# Patient Record
Sex: MALE | Race: WHITE | NOT HISPANIC OR LATINO | URBAN - METROPOLITAN AREA
[De-identification: names, ages, dates, MRNs, and addresses within clinical notes are randomized per-mention and may not be internally consistent; named-entity substitution may affect disease eponyms.]

---

## 2020-01-28 ENCOUNTER — APPOINTMENT (RX ONLY)
Dept: URBAN - METROPOLITAN AREA CLINIC 16 | Facility: CLINIC | Age: 20
Setting detail: DERMATOLOGY
End: 2020-01-28

## 2020-01-28 DIAGNOSIS — D22 MELANOCYTIC NEVI: ICD-10-CM

## 2020-01-28 DIAGNOSIS — L30.4 ERYTHEMA INTERTRIGO: ICD-10-CM

## 2020-01-28 PROBLEM — D22.61 MELANOCYTIC NEVI OF RIGHT UPPER LIMB, INCLUDING SHOULDER: Status: ACTIVE | Noted: 2020-01-28

## 2020-01-28 PROCEDURE — ? SUNSCREEN RECOMMENDATIONS

## 2020-01-28 PROCEDURE — ? COUNSELING

## 2020-01-28 PROCEDURE — 99202 OFFICE O/P NEW SF 15 MIN: CPT

## 2020-01-28 PROCEDURE — ? TREATMENT REGIMEN

## 2020-01-28 PROCEDURE — ? DIAGNOSIS COMMENT

## 2020-01-28 PROCEDURE — ? PRESCRIPTION

## 2020-01-28 RX ORDER — ZINC OXIDE 40 G/100G
OINTMENT TOPICAL
Qty: 1 | Refills: 3 | Status: ERX | COMMUNITY
Start: 2020-01-28

## 2020-01-28 RX ORDER — TRIAMCINOLONE ACETONIDE 1 MG/G
CREAM TOPICAL
Qty: 1 | Refills: 1 | Status: ERX | COMMUNITY
Start: 2020-01-28

## 2020-01-28 RX ORDER — KETOCONAZOLE 20 MG/G
CREAM TOPICAL
Qty: 1 | Refills: 3 | Status: ERX | COMMUNITY
Start: 2020-01-28

## 2020-01-28 RX ADMIN — TRIAMCINOLONE ACETONIDE: 1 CREAM TOPICAL at 00:00

## 2020-01-28 RX ADMIN — KETOCONAZOLE: 20 CREAM TOPICAL at 00:00

## 2020-01-28 RX ADMIN — ZINC OXIDE: 40 OINTMENT TOPICAL at 00:00

## 2020-01-28 ASSESSMENT — LOCATION DETAILED DESCRIPTION DERM
LOCATION DETAILED: RIGHT ANTERIOR SHOULDER
LOCATION DETAILED: GLUTEAL CLEFT
LOCATION DETAILED: RIGHT ANTERIOR PROXIMAL THIGH
LOCATION DETAILED: LEFT INGUINAL CREASE

## 2020-01-28 ASSESSMENT — LOCATION SIMPLE DESCRIPTION DERM
LOCATION SIMPLE: RIGHT SHOULDER
LOCATION SIMPLE: GROIN
LOCATION SIMPLE: GLUTEAL CLEFT
LOCATION SIMPLE: RIGHT THIGH

## 2020-01-28 ASSESSMENT — LOCATION ZONE DERM
LOCATION ZONE: ARM
LOCATION ZONE: TRUNK
LOCATION ZONE: LEG

## 2020-01-28 NOTE — HPI: RASH
How Severe Is Your Rash?: moderate
Is This A New Presentation, Or A Follow-Up?: Rash
Additional History: perineum area is the biggest concern

## 2020-01-28 NOTE — PROCEDURE: TREATMENT REGIMEN
Detail Level: Simple
Initiate Treatment: For flare of rash: \\nStart ketoconazole 2% cream twice daily \\nThen apply triamcinolone 0.1% cream twice daily as needed. If need to use beyond 2 consecutive weeks, then decrease frequency of use to Monday-Friday. \\nAfter applying both of these, apply zinc oxide 40% paste - if not covered by insurance, then recommend purchasing over-the-counter Triple Paste. Use a very thick layer of this. \\n\\nWhen flare resolves: \\nFor prevention, use either Zeasorb AF (antifungal) powder and/or prescription-strength deodorant such as Dove.\\n\\nRecommend avoidance of scratching

## 2020-01-28 NOTE — PROCEDURE: MIPS QUALITY
Detail Level: Detailed
Quality 47: Advance Care Plan: Advance Care Planning discussed and documented; advance care plan or surrogate decision maker documented in the medical record.
Quality 226: Preventive Care And Screening: Tobacco Use: Screening And Cessation Intervention: Patient screened for tobacco use, is a smoker AND received Cessation Counseling
Quality 110: Preventive Care And Screening: Influenza Immunization: Influenza Immunization Administered during Influenza season

## 2020-01-28 NOTE — PROCEDURE: DIAGNOSIS COMMENT
Detail Level: Simple
Comment: Moderate to severe intertrigo with erosions from scratching involving groin and perineum. Start regimen as below, f/u 4 weeks

## 2020-02-25 ENCOUNTER — APPOINTMENT (RX ONLY)
Dept: URBAN - METROPOLITAN AREA CLINIC 16 | Facility: CLINIC | Age: 20
Setting detail: DERMATOLOGY
End: 2020-02-25

## 2020-02-25 DIAGNOSIS — L30.4 ERYTHEMA INTERTRIGO: ICD-10-CM

## 2020-02-25 PROCEDURE — ? PRESCRIPTION

## 2020-02-25 PROCEDURE — ? TREATMENT REGIMEN

## 2020-02-25 PROCEDURE — ? COUNSELING

## 2020-02-25 PROCEDURE — ? DIAGNOSIS COMMENT

## 2020-02-25 PROCEDURE — 99213 OFFICE O/P EST LOW 20 MIN: CPT

## 2020-02-25 PROCEDURE — ? ORDER TESTS

## 2020-02-25 RX ORDER — TACROLIMUS 1 MG/G
OINTMENT TOPICAL
Qty: 1 | Refills: 1 | Status: ERX | COMMUNITY
Start: 2020-02-25

## 2020-02-25 RX ORDER — BETAMETHASONE DIPROPIONATE 0.5 MG/G
OINTMENT, AUGMENTED TOPICAL
Qty: 1 | Refills: 0 | Status: ERX | COMMUNITY
Start: 2020-02-25

## 2020-02-25 RX ADMIN — BETAMETHASONE DIPROPIONATE: 0.5 OINTMENT, AUGMENTED TOPICAL at 00:00

## 2020-02-25 RX ADMIN — TACROLIMUS: 1 OINTMENT TOPICAL at 00:00

## 2020-02-25 ASSESSMENT — LOCATION SIMPLE DESCRIPTION DERM
LOCATION SIMPLE: GLUTEAL CLEFT
LOCATION SIMPLE: GROIN
LOCATION SIMPLE: RIGHT THIGH

## 2020-02-25 ASSESSMENT — LOCATION ZONE DERM
LOCATION ZONE: TRUNK
LOCATION ZONE: LEG

## 2020-02-25 ASSESSMENT — LOCATION DETAILED DESCRIPTION DERM
LOCATION DETAILED: GLUTEAL CLEFT
LOCATION DETAILED: LEFT INGUINAL CREASE
LOCATION DETAILED: RIGHT ANTERIOR PROXIMAL THIGH

## 2020-02-25 NOTE — PROCEDURE: DIAGNOSIS COMMENT
Comment: Moderate to severe intertrigo resolved from groin but persistent on scrotum. Patient does endorse washing aggressively in the shower, favor component of irritant contact dermatitis. Given persistence on perineum with geometric-shaped erosions, swab sent for HSV culture today. While awaiting results, plan to increase strength of topical steroid to betamethasone aug ointment prior to transitioning to tacrolimus ointment; extensively reviewed the importance of avoiding scratching the area and to maintain a thick layer of zinc oxide paste over the area. If not resolved at follow-up, consider biopsy to rule out other process (eg Ambar-Ambar disease) as well as fungal and bacterial cultures. F/u 4 weeks
Detail Level: Simple

## 2020-02-25 NOTE — PROCEDURE: TREATMENT REGIMEN
Initiate Treatment: To more severe area of rash on perineum: \\nStart betamethasone diproprionate augmented 0.05% ointment twice daily x 2 weeks and then transition to tacrolimus 0.1% ointment twice daily until follow-up appointment. Apply a thick layer of zinc oxide paste over top. Avoid scrubbing this area.
Continue Regimen: For flare of rash: \\nContinue ketoconazole 2% cream twice daily \\nThen apply triamcinolone 0.1% cream twice daily as needed. If need to use beyond 2 consecutive weeks, then decrease frequency of use to Monday-Friday. \\nAfter applying both of these, apply zinc oxide 40% paste - if not covered by insurance, then recommend purchasing over-the-counter Triple Paste. Use a very thick layer of this. \\n\\nWhen flare resolves: \\nFor prevention, use either Zeasorb AF (antifungal) powder and/or prescription-strength deodorant such as Dove.\\n\\nRecommend avoidance of scratching or scrubbing
Detail Level: Simple

## 2020-05-06 RX ORDER — TRIAMCINOLONE ACETONIDE 1 MG/G
CREAM TOPICAL
Qty: 1 | Refills: 0 | Status: ERX

## 2020-05-06 RX ORDER — KETOCONAZOLE 20 MG/G
CREAM TOPICAL
Qty: 1 | Refills: 1 | Status: ERX

## 2020-06-11 ENCOUNTER — APPOINTMENT (RX ONLY)
Dept: URBAN - METROPOLITAN AREA CLINIC 16 | Facility: CLINIC | Age: 20
Setting detail: DERMATOLOGY
End: 2020-06-11

## 2020-06-11 DIAGNOSIS — L30.4 ERYTHEMA INTERTRIGO: ICD-10-CM

## 2020-06-11 PROCEDURE — 99213 OFFICE O/P EST LOW 20 MIN: CPT

## 2020-06-11 PROCEDURE — ? COUNSELING

## 2020-06-11 PROCEDURE — ? DIAGNOSIS COMMENT

## 2020-06-11 PROCEDURE — ? PRESCRIPTION

## 2020-06-11 PROCEDURE — ? TREATMENT REGIMEN

## 2020-06-11 RX ORDER — BETAMETHASONE DIPROPIONATE 0.5 MG/G
OINTMENT, AUGMENTED TOPICAL
Qty: 1 | Refills: 1 | Status: ERX

## 2020-06-11 RX ORDER — TACROLIMUS 1 MG/G
OINTMENT TOPICAL
Qty: 1 | Refills: 1 | Status: ERX

## 2020-06-11 ASSESSMENT — LOCATION SIMPLE DESCRIPTION DERM
LOCATION SIMPLE: GROIN
LOCATION SIMPLE: RIGHT THIGH
LOCATION SIMPLE: GLUTEAL CLEFT

## 2020-06-11 ASSESSMENT — LOCATION DETAILED DESCRIPTION DERM
LOCATION DETAILED: LEFT INGUINAL CREASE
LOCATION DETAILED: RIGHT ANTERIOR PROXIMAL THIGH
LOCATION DETAILED: GLUTEAL CLEFT

## 2020-06-11 ASSESSMENT — LOCATION ZONE DERM
LOCATION ZONE: TRUNK
LOCATION ZONE: LEG

## 2020-06-11 NOTE — PROCEDURE: DIAGNOSIS COMMENT
Detail Level: Simple
Comment: Moderate to severe intertrigo resolved from groin and scrotum but flaring on perineum. Patient reports that he was not compliant with regimen after last visit due to pandemic. Swab for HSV negative at last visit. Reviewed regimen in detail today and handout provided. Given improvement, hold off on biopsy to rule out other process (eg Ambar-Ambar disease) for now, no need for fungal and bacterial cultures now - but reconsider if not responding to treatment as expected at f/u appointment.

## 2020-06-11 NOTE — PROCEDURE: TREATMENT REGIMEN
Detail Level: Simple
Continue Regimen: For flare of rash: \\nContinue ketoconazole 2% cream twice daily \\nThen apply triamcinolone 0.1% cream twice daily as needed. If need to use beyond 2 consecutive weeks, then decrease frequency of use to Monday-Friday. \\nAfter applying both of these, apply zinc oxide 40% paste - if not covered by insurance, then recommend purchasing over-the-counter Triple Paste. Use a very thick layer of this. \\n\\nTo more severe area of rash on buttocks: \\nUse betamethasone diproprionate augmented 0.05% ointment twice daily x 2 weeks and then transition to tacrolimus 0.1% ointment twice daily until follow-up appointment. Apply a thick layer of zinc oxide paste over top. Avoid scrubbing this area.\\n\\nWhen flare resolves: \\nFor prevention, use either Zeasorb AF (antifungal) powder and/or prescription-strength deodorant such as Dove.\\n\\nRecommend avoidance of scratching or scrubbing

## 2020-07-06 ENCOUNTER — APPOINTMENT (RX ONLY)
Dept: URBAN - METROPOLITAN AREA CLINIC 16 | Facility: CLINIC | Age: 20
Setting detail: DERMATOLOGY
End: 2020-07-06

## 2020-07-06 DIAGNOSIS — L30.4 ERYTHEMA INTERTRIGO: ICD-10-CM

## 2020-07-06 PROBLEM — L30.9 DERMATITIS, UNSPECIFIED: Status: ACTIVE | Noted: 2020-07-06

## 2020-07-06 PROCEDURE — ? COUNSELING

## 2020-07-06 PROCEDURE — ? PRESCRIPTION

## 2020-07-06 PROCEDURE — 11104 PUNCH BX SKIN SINGLE LESION: CPT

## 2020-07-06 PROCEDURE — ? DIAGNOSIS COMMENT

## 2020-07-06 PROCEDURE — ? BIOPSY BY PUNCH METHOD

## 2020-07-06 PROCEDURE — ? TREATMENT REGIMEN

## 2020-07-06 RX ORDER — TACROLIMUS 1 MG/G
OINTMENT TOPICAL
Qty: 1 | Refills: 1 | Status: ERX

## 2020-07-06 ASSESSMENT — LOCATION ZONE DERM
LOCATION ZONE: LEG
LOCATION ZONE: TRUNK
LOCATION ZONE: GENITALIA

## 2020-07-06 ASSESSMENT — LOCATION DETAILED DESCRIPTION DERM
LOCATION DETAILED: RIGHT ANTERIOR PROXIMAL THIGH
LOCATION DETAILED: PERINEUM
LOCATION DETAILED: GLUTEAL CLEFT
LOCATION DETAILED: LEFT INGUINAL CREASE

## 2020-07-06 ASSESSMENT — LOCATION SIMPLE DESCRIPTION DERM
LOCATION SIMPLE: GLUTEAL CLEFT
LOCATION SIMPLE: RIGHT THIGH
LOCATION SIMPLE: GROIN
LOCATION SIMPLE: PERINEUM

## 2020-07-06 NOTE — PROCEDURE: DIAGNOSIS COMMENT
Detail Level: Simple
Comment: Moderate to severe intertrigo resolved from groin and scrotum but still flaring on left side of perineum. Patient reports that he has been using tacrolimus ointment consistently since last visit with about 50% improvement. He was not able to tolerate the betamethasone augmented ointment - felt that it caused more redness and irritation so advised that he avoid further use. Swab for HSV negative at prior visit. \\n\\nGiven persistence of involvement of left side of perineum despite compliance with regimen, recommend biopsy to rule out Ambar-Ambar disease vs Paget’s disease vs Bowen’s disease. If biopsy is still consistent with intertrigo, plan to switch class I steroid to clobetasol ointment. Reviewed regimen in detail today and handout provided.

## 2020-07-06 NOTE — PROCEDURE: TREATMENT REGIMEN
Detail Level: Simple
Continue Regimen: For flare of rash: \\nContinue ketoconazole 2% cream twice daily \\nThen apply triamcinolone 0.1% cream twice daily as needed For up to 2 consecutive weeks and then switch to tacrolimus ointment twice daily \\nAfter applying both of these, apply zinc oxide 40% paste - if not covered by insurance, then recommend purchasing over-the-counter Triple Paste. Use a very thick layer of this. \\n\\nWhen flare resolves: \\nFor prevention, use either Zeasorb AF (antifungal) powder and/or prescription-strength deodorant such as Dove.\\n\\nRecommend avoidance of scratching or scrubbing
Discontinue Regimen: Stop betamethasone diproprionate aug ointment given irritation

## 2020-07-06 NOTE — PROCEDURE: BIOPSY BY PUNCH METHOD
Detail Level: Simple
Was A Bandage Applied: Yes
Punch Size In Mm: 4
Biopsy Type: H and E
Anesthesia Type: 1% lidocaine with 1:100,000 epinephrine
Anesthesia Volume In Cc (Will Not Render If 0): 1
Additional Anesthesia Volume In Cc (Will Not Render If 0): 0
Hemostasis: None
Epidermal Sutures: 5-0 Fast Absorbing Gut
Wound Care: Petrolatum
Dressing: telfa dressing
Suture Removal: 14 days
Patient Will Remove Sutures At Home?: No
Lab: 11
Path Notes Override (Will Replace All Of The Above Text): History of erosive intertrigo - with erosive patches of groin, scrotum, and perineum - all sites have responded to topical therapy as expected excepted for persistent eroded patch of left perineum.
Consent: Written consent was obtained and risks were reviewed including but not limited to scarring, infection, bleeding, scabbing, incomplete removal, nerve damage and allergy to anesthesia.
Post-Care Instructions: I reviewed with the patient in detail post-care instructions. Patient is to keep the biopsy site dry overnight, and then apply bacitracin twice daily until healed. Patient may apply hydrogen peroxide soaks to remove any crusting.
Home Suture Removal Text: Patient was provided a home suture removal kit and will remove their sutures at home.  If they have any questions or difficulties they will call the office.
Notification Instructions: Patient will be notified of biopsy results. However, patient instructed to call the office if not contacted within 2 weeks.
Billing Type: Third-Party Bill
Information: Selecting Yes will display possible errors in your note based on the variables you have selected. This validation is only offered as a suggestion for you. PLEASE NOTE THAT THE VALIDATION TEXT WILL BE REMOVED WHEN YOU FINALIZE YOUR NOTE. IF YOU WANT TO FAX A PRELIMINARY NOTE YOU WILL NEED TO TOGGLE THIS TO 'NO' IF YOU DO NOT WANT IT IN YOUR FAXED NOTE.

## 2020-07-20 ENCOUNTER — APPOINTMENT (RX ONLY)
Dept: URBAN - METROPOLITAN AREA CLINIC 16 | Facility: CLINIC | Age: 20
Setting detail: DERMATOLOGY
End: 2020-07-20

## 2020-07-20 DIAGNOSIS — L30.9 DERMATITIS, UNSPECIFIED: ICD-10-CM

## 2020-07-20 PROCEDURE — ? DIAGNOSIS COMMENT

## 2020-07-20 PROCEDURE — ? PRESCRIPTION

## 2020-07-20 PROCEDURE — ? MEDICATION COUNSELING

## 2020-07-20 RX ORDER — DOXYCYCLINE HYCLATE 100 MG/1
CAPSULE, GELATIN COATED ORAL
Qty: 30 | Refills: 0 | Status: ERX | COMMUNITY
Start: 2020-07-20

## 2020-07-20 RX ORDER — CLINDAMYCIN PHOSPHATE 10 MG/ML
LOTION TOPICAL
Qty: 1 | Refills: 3 | Status: ERX | COMMUNITY
Start: 2020-07-20

## 2020-07-20 RX ORDER — DESOXIMETASONE 2.5 MG/G
OINTMENT TOPICAL
Qty: 1 | Refills: 0 | Status: ERX | COMMUNITY
Start: 2020-07-20

## 2020-07-20 RX ADMIN — CLINDAMYCIN PHOSPHATE: 10 LOTION TOPICAL at 00:00

## 2020-07-20 RX ADMIN — DESOXIMETASONE: 2.5 OINTMENT TOPICAL at 00:00

## 2020-07-20 RX ADMIN — DOXYCYCLINE HYCLATE: 100 CAPSULE, GELATIN COATED ORAL at 00:00

## 2020-07-20 NOTE — PROCEDURE: DIAGNOSIS COMMENT
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 Recommend stopping tacrolimus ointment and switching to stronger topical steroid ointment - desoximetasone 0.25% ointment BID for up to 2 weeks, adding clindamycin lotion daily and doxycycline 100mg BID x 2 weeks given possible Corynebacteria organisms on biopsy. If still not improving with this regimen at f/u, consider lab work-up to rule out nutritional deficiency. F/u July 29 at 1:15pm - latest date that patient can fu prior to leaving for college. DEBORA [place].
Detail Level: Simple

## 2020-07-20 NOTE — PROCEDURE: MEDICATION COUNSELING
Xelkavithaz Pregnancy And Lactation Text: This medication is Pregnancy Category D and is not considered safe during pregnancy.  The risk during breast feeding is also uncertain.

## 2020-07-29 ENCOUNTER — APPOINTMENT (RX ONLY)
Dept: URBAN - METROPOLITAN AREA CLINIC 16 | Facility: CLINIC | Age: 20
Setting detail: DERMATOLOGY
End: 2020-07-29

## 2020-07-29 DIAGNOSIS — L30.4 ERYTHEMA INTERTRIGO: ICD-10-CM | Status: IMPROVED

## 2020-07-29 PROCEDURE — 99213 OFFICE O/P EST LOW 20 MIN: CPT

## 2020-07-29 PROCEDURE — ? DIAGNOSIS COMMENT

## 2020-07-29 PROCEDURE — ? TREATMENT REGIMEN

## 2020-07-29 PROCEDURE — ? COUNSELING

## 2020-07-29 ASSESSMENT — LOCATION SIMPLE DESCRIPTION DERM
LOCATION SIMPLE: GROIN
LOCATION SIMPLE: GLUTEAL CLEFT
LOCATION SIMPLE: RIGHT THIGH

## 2020-07-29 ASSESSMENT — LOCATION DETAILED DESCRIPTION DERM
LOCATION DETAILED: RIGHT ANTERIOR PROXIMAL THIGH
LOCATION DETAILED: LEFT INGUINAL CREASE
LOCATION DETAILED: GLUTEAL CLEFT

## 2020-07-29 ASSESSMENT — LOCATION ZONE DERM
LOCATION ZONE: TRUNK
LOCATION ZONE: LEG

## 2020-07-29 NOTE — PROCEDURE: DIAGNOSIS COMMENT
Detail Level: Simple
Comment: Moderate to severe intertrigo with possible component of erythrasma resolved from groin and scrotum and much improved on perineum since last visit. Given improvement and lack of involvement of perioral area and no history of nutritional deficiency or GI surgery (eg gastric bypass), no need for lab work-up to evaluate for nutritional deficiency at this time. \\n\\nBiopsy at last visit showed crusted psoriasiform dermatitis with small, slender structures within the stratum corneum which could be Corynebacteria organisms. \\n\\nNotes: \\nHe was not able to tolerate betamethasone augmented ointment\\nSwab for HSV negative at prior visit

## 2020-07-29 NOTE — PROCEDURE: TREATMENT REGIMEN
Detail Level: Simple
Continue Regimen: Complete course of doxycycline \\n\\nFor flare of rash: \\nContinue ketoconazole 2% cream twice daily \\nFor more severe flare: use desoximetasone ointment twice daily as needed For up to 1-2 consecutive weeks and then switch to tacrolimus ointment twice daily \\nFor less severe flare: tacrolimus ointment twice daily \\n\\nContinue clindamycin lotion to affected area daily until resolved \\n\\nAfter applying both of these, apply zinc oxide 40% paste - if not covered by insurance, then recommend purchasing over-the-counter Triple Paste. Use a very thick layer of this. \\n\\nWhen flare resolves: \\nFor prevention, use either Zeasorb AF (antifungal) powder and/or prescription-strength deodorant such as Dove (apply at night).\\n\\nRecommend avoidance of scratching or scrubbing
Discontinue Regimen: Stop betamethasone diproprionate aug ointment given irritation\\nRemain off triamcinolone cream for now

## 2020-10-06 RX ORDER — DESOXIMETASONE 2.5 MG/G
OINTMENT TOPICAL
Qty: 1 | Refills: 0 | Status: ERX

## 2020-12-02 ENCOUNTER — APPOINTMENT (RX ONLY)
Dept: URBAN - METROPOLITAN AREA CLINIC 16 | Facility: CLINIC | Age: 20
Setting detail: DERMATOLOGY
End: 2020-12-02

## 2020-12-02 DIAGNOSIS — L90.6 STRIAE ATROPHICAE: ICD-10-CM

## 2020-12-02 DIAGNOSIS — D22 MELANOCYTIC NEVI: ICD-10-CM

## 2020-12-02 DIAGNOSIS — L30.4 ERYTHEMA INTERTRIGO: ICD-10-CM | Status: WELL CONTROLLED

## 2020-12-02 PROBLEM — D22.61 MELANOCYTIC NEVI OF RIGHT UPPER LIMB, INCLUDING SHOULDER: Status: ACTIVE | Noted: 2020-12-02

## 2020-12-02 PROCEDURE — 99213 OFFICE O/P EST LOW 20 MIN: CPT

## 2020-12-02 PROCEDURE — ? TREATMENT REGIMEN

## 2020-12-02 PROCEDURE — ? PRESCRIPTION

## 2020-12-02 PROCEDURE — ? COUNSELING

## 2020-12-02 PROCEDURE — ? DIAGNOSIS COMMENT

## 2020-12-02 RX ORDER — HYDROCORTISONE 25 MG/G
OINTMENT TOPICAL
Qty: 2 | Refills: 1 | Status: ERX | COMMUNITY
Start: 2020-12-02

## 2020-12-02 RX ADMIN — HYDROCORTISONE: 25 OINTMENT TOPICAL at 00:00

## 2020-12-02 ASSESSMENT — LOCATION DETAILED DESCRIPTION DERM
LOCATION DETAILED: RIGHT ANTERIOR PROXIMAL THIGH
LOCATION DETAILED: RIGHT ANTERIOR SHOULDER
LOCATION DETAILED: RIGHT ANTERIOR PROXIMAL THIGH
LOCATION DETAILED: LEFT INGUINAL CREASE
LOCATION DETAILED: LEFT ANTERIOR PROXIMAL THIGH
LOCATION DETAILED: GLUTEAL CLEFT
LOCATION DETAILED: LEFT ANTERIOR PROXIMAL THIGH

## 2020-12-02 ASSESSMENT — LOCATION SIMPLE DESCRIPTION DERM
LOCATION SIMPLE: LEFT THIGH
LOCATION SIMPLE: GROIN
LOCATION SIMPLE: RIGHT SHOULDER
LOCATION SIMPLE: LEFT THIGH
LOCATION SIMPLE: RIGHT THIGH
LOCATION SIMPLE: GLUTEAL CLEFT
LOCATION SIMPLE: RIGHT THIGH

## 2020-12-02 ASSESSMENT — LOCATION ZONE DERM
LOCATION ZONE: ARM
LOCATION ZONE: TRUNK
LOCATION ZONE: LEG
LOCATION ZONE: LEG

## 2020-12-02 NOTE — PROCEDURE: TREATMENT REGIMEN
Detail Level: Simple
Continue Regimen: For flare of rash: \\nContinue ketoconazole 2% cream twice daily \\nFor more severe flare: use desoximetasone ointment twice daily as needed For up to 1-2 consecutive weeks and then switch to tacrolimus ointment twice daily \\nFor less severe flare: hydrocortisone ointment twice daily \\n\\nContinue clindamycin lotion to affected area daily until resolved \\n\\nAfter applying both of these, apply zinc oxide 40% paste. Use a very thick layer of this. \\n\\nWhen flare resolves: \\nFor prevention, use either Zeasorb AF (antifungal) powder and/or prescription-strength deodorant such as Dove (apply at night).\\n\\nRecommend avoidance of scratching or scrubbing
Discontinue Regimen: Stop betamethasone diproprionate aug ointment given irritation\\nRemain off triamcinolone cream for now\\nStop tacrolimus ointment given irritation

## 2020-12-02 NOTE — PROCEDURE: DIAGNOSIS COMMENT
Detail Level: Simple
Comment: Well controlled intertrigo with possible component of erythrasma - remains resolved from groin and scrotum and resolved on perineum today. He has not been using zinc oxide consistently. He feels that his skin is getting irritated from using tacrolimus ointment so plan to discontinue. He has a few striae on his inner thighs - discussed risk of striae with use of topical steroids, he stated understanding. He is electing to switch to hydrocortisone ointment for mild flares rather than trial Elidel cream at this time but reviewed importance of only using topical steroids for flares and avoiding application to thighs. \\n\\nGiven improvement and lack of involvement of perioral area and no history of nutritional deficiency or GI surgery (eg gastric bypass), no need for lab work-up to evaluate for nutritional deficiency at this time. \\n\\nBiopsy at prior visit showed crusted psoriasiform dermatitis with small, slender structures within the stratum corneum which could be Corynebacteria organisms. \\n\\nNotes: \\nHe was not able to tolerate betamethasone augmented ointment\\nSwab for HSV negative at prior visit
Comment: Likely due to topical steroid use - discussed this in detail and importance of minimizing topical steroid use in general and avoiding application to this area.

## 2021-01-06 ENCOUNTER — APPOINTMENT (RX ONLY)
Dept: URBAN - METROPOLITAN AREA CLINIC 16 | Facility: CLINIC | Age: 21
Setting detail: DERMATOLOGY
End: 2021-01-06

## 2021-01-06 DIAGNOSIS — L30.4 ERYTHEMA INTERTRIGO: ICD-10-CM

## 2021-01-06 DIAGNOSIS — L20.89 OTHER ATOPIC DERMATITIS: ICD-10-CM

## 2021-01-06 DIAGNOSIS — L90.6 STRIAE ATROPHICAE: ICD-10-CM

## 2021-01-06 PROCEDURE — ? PRESCRIPTION MEDICATION MANAGEMENT

## 2021-01-06 PROCEDURE — 99214 OFFICE O/P EST MOD 30 MIN: CPT

## 2021-01-06 PROCEDURE — ? TREATMENT REGIMEN

## 2021-01-06 PROCEDURE — ? COUNSELING

## 2021-01-06 PROCEDURE — ? PRESCRIPTION

## 2021-01-06 PROCEDURE — ? DIAGNOSIS COMMENT

## 2021-01-06 RX ORDER — HYDROCORTISONE 25 MG/G
OINTMENT TOPICAL
Qty: 2 | Refills: 2 | Status: ERX

## 2021-01-06 ASSESSMENT — LOCATION DETAILED DESCRIPTION DERM
LOCATION DETAILED: LEFT INGUINAL CREASE
LOCATION DETAILED: LEFT ANTECUBITAL SKIN
LOCATION DETAILED: LEFT ANTERIOR PROXIMAL THIGH
LOCATION DETAILED: RIGHT VENTRAL PROXIMAL FOREARM
LOCATION DETAILED: GLUTEAL CLEFT
LOCATION DETAILED: LEFT ANTERIOR PROXIMAL THIGH
LOCATION DETAILED: RIGHT ANTERIOR PROXIMAL THIGH
LOCATION DETAILED: RIGHT ANTERIOR PROXIMAL THIGH

## 2021-01-06 ASSESSMENT — LOCATION ZONE DERM
LOCATION ZONE: LEG
LOCATION ZONE: ARM
LOCATION ZONE: LEG
LOCATION ZONE: TRUNK

## 2021-01-06 ASSESSMENT — LOCATION SIMPLE DESCRIPTION DERM
LOCATION SIMPLE: RIGHT THIGH
LOCATION SIMPLE: LEFT THIGH
LOCATION SIMPLE: RIGHT FOREARM
LOCATION SIMPLE: RIGHT THIGH
LOCATION SIMPLE: GROIN
LOCATION SIMPLE: LEFT THIGH
LOCATION SIMPLE: LEFT ELBOW
LOCATION SIMPLE: GLUTEAL CLEFT

## 2021-01-06 NOTE — PROCEDURE: DIAGNOSIS COMMENT
Detail Level: Simple
Comment: Well controlled intertrigo with possible component of erythrasma - remains resolved from scrotum and perineum, mild flare in groin today likely precipitated by rubbing/friction from excercise. \\n\\nHe reports that flares have been less frequently - using hydrocortisone every few weeks and not needing to use desoximetasone, has been using zinc oxide paste more consistently. Stopped tacrolimus ointment due to irritation. Given improvement, will hold off on adding Elidel cream for now. Reviewed importance of minimizing use of topical steroids and avoiding application to thighs given development of striae.\\n\\nBiopsy at prior visit showed crusted psoriasiform dermatitis with small, slender structures within the stratum corneum which could be Corynebacteria organisms. \\n\\nNotes: \\nHe was not able to tolerate betamethasone augmented ointment\\nTacrolimus ointment cause irritation \\nSwab for HSV negative at prior visit
Comment: Likely due to topical steroid use - discussed this in detail and importance of minimizing topical steroid use in general and avoiding application to this area.

## 2021-01-06 NOTE — PROCEDURE: PRESCRIPTION MEDICATION MANAGEMENT
Render In Strict Bullet Format?: No
Initiate Treatment: Start desoximetasone ointment BID prn Monday-Friday, off on weekends, stop when improved. \\nRecommend gentle skin care with frequent use of emollients - see handout
Detail Level: Zone

## 2021-01-06 NOTE — PROCEDURE: TREATMENT REGIMEN
Detail Level: Simple
Continue Regimen: For flare of rash: \\nContinue ketoconazole 2% cream twice daily \\nFor more severe flare: use desoximetasone ointment twice daily as needed For up to 1-2 consecutive weeks; avoid application to thighs \\nFor less severe flare: hydrocortisone ointment twice daily \\n\\nContinue clindamycin lotion to affected area daily until resolved \\n\\nAfter applying both of these, apply zinc oxide 40% paste. Use a very thick layer of this. \\n\\nWhen flare resolves: \\nFor prevention, use either Zeasorb AF (antifungal) powder and/or prescription-strength deodorant such as Dove (apply at night).\\n\\nRecommend avoidance of scratching or scrubbing
Discontinue Regimen: Stop betamethasone diproprionate aug ointment given irritation\\nRemain off triamcinolone cream for now\\nStop tacrolimus ointment given irritation

## 2021-01-11 RX ORDER — DESOXIMETASONE 2.5 MG/G
OINTMENT TOPICAL
Qty: 1 | Refills: 0 | Status: ERX

## 2021-09-08 ENCOUNTER — RX ONLY (OUTPATIENT)
Age: 21
Setting detail: RX ONLY
End: 2021-09-08

## 2021-09-08 RX ORDER — DESOXIMETASONE 2.5 MG/G
OINTMENT TOPICAL
Qty: 60 | Refills: 0 | Status: ERX

## 2021-12-22 ENCOUNTER — APPOINTMENT (RX ONLY)
Dept: URBAN - METROPOLITAN AREA CLINIC 16 | Facility: CLINIC | Age: 21
Setting detail: DERMATOLOGY
End: 2021-12-22

## 2021-12-22 DIAGNOSIS — L30.4 ERYTHEMA INTERTRIGO: ICD-10-CM

## 2021-12-22 DIAGNOSIS — L90.6 STRIAE ATROPHICAE: ICD-10-CM

## 2021-12-22 DIAGNOSIS — L20.89 OTHER ATOPIC DERMATITIS: ICD-10-CM

## 2021-12-22 PROCEDURE — ? DIAGNOSIS COMMENT

## 2021-12-22 PROCEDURE — ? COUNSELING

## 2021-12-22 PROCEDURE — ? PRESCRIPTION MEDICATION MANAGEMENT

## 2021-12-22 PROCEDURE — ? PRESCRIPTION

## 2021-12-22 PROCEDURE — ? TREATMENT REGIMEN

## 2021-12-22 PROCEDURE — 99214 OFFICE O/P EST MOD 30 MIN: CPT

## 2021-12-22 RX ORDER — HYDROCORTISONE 25 MG/G
CREAM TOPICAL BID
Qty: 28 | Refills: 2 | Status: ERX | COMMUNITY
Start: 2021-12-22

## 2021-12-22 RX ORDER — DESOXIMETASONE 2.5 MG/G
CREAM TOPICAL
Qty: 60 | Refills: 1 | Status: ERX | COMMUNITY
Start: 2021-12-22

## 2021-12-22 RX ADMIN — HYDROCORTISONE: 25 CREAM TOPICAL at 00:00

## 2021-12-22 RX ADMIN — DESOXIMETASONE: 2.5 CREAM TOPICAL at 00:00

## 2021-12-22 ASSESSMENT — LOCATION SIMPLE DESCRIPTION DERM
LOCATION SIMPLE: LEFT THIGH
LOCATION SIMPLE: RIGHT FOREARM
LOCATION SIMPLE: LEFT ELBOW
LOCATION SIMPLE: RIGHT THIGH
LOCATION SIMPLE: GROIN
LOCATION SIMPLE: GLUTEAL CLEFT
LOCATION SIMPLE: RIGHT THIGH
LOCATION SIMPLE: LEFT THIGH

## 2021-12-22 ASSESSMENT — LOCATION ZONE DERM
LOCATION ZONE: ARM
LOCATION ZONE: LEG
LOCATION ZONE: LEG
LOCATION ZONE: TRUNK

## 2021-12-22 ASSESSMENT — LOCATION DETAILED DESCRIPTION DERM
LOCATION DETAILED: LEFT ANTERIOR PROXIMAL THIGH
LOCATION DETAILED: RIGHT ANTERIOR PROXIMAL THIGH
LOCATION DETAILED: GLUTEAL CLEFT
LOCATION DETAILED: RIGHT VENTRAL PROXIMAL FOREARM
LOCATION DETAILED: LEFT INGUINAL CREASE
LOCATION DETAILED: LEFT ANTECUBITAL SKIN
LOCATION DETAILED: RIGHT ANTERIOR PROXIMAL THIGH
LOCATION DETAILED: LEFT ANTERIOR PROXIMAL THIGH

## 2021-12-22 NOTE — PROCEDURE: PRESCRIPTION MEDICATION MANAGEMENT
Render In Strict Bullet Format?: No
Detail Level: Zone
Continue Regimen: As needed for flares: Continue desoximetasone ointment BID prn Monday-Friday, off on weekends, stop when improved. \\nRecommend gentle skin care with frequent use of emollients - see handout

## 2021-12-22 NOTE — PROCEDURE: TREATMENT REGIMEN
Initiate Treatment: **If using hydrocortisone cream more frequently, call office for prescription for Elidel crema
Detail Level: Simple
Continue Regimen: For flare of rash: \\nContinue ketoconazole 2% cream twice daily \\nOnly for more severe flare: use desoximetasone cream twice daily as needed For up to 1-2 consecutive weeks; avoid application to thighs \\nFor less severe flare: hydrocortisone cream twice daily \\n\\nAfter applying both of these, apply zinc oxide 40% paste or Vaseline (if preferred). Use a very thick layer of this. \\n\\nWhen flare resolves: \\nFor prevention, use either Zeasorb AF (antifungal) powder and/or prescription-strength deodorant such as Dove (apply at night).\\n\\nRecommend avoidance of scratching or scrubbing
Discontinue Regimen: Stop betamethasone diproprionate aug ointment given irritation\\nRemain off triamcinolone cream for now\\nStop tacrolimus ointment given irritation\\nRemain off clindamycin lotion

## 2021-12-22 NOTE — PROCEDURE: DIAGNOSIS COMMENT
Detail Level: Simple
Comment: Overall well controlled intertrigo (prior component of erythrasma clear on exam today) - mild flare likely precipitated by rubbing/friction from excercise/walking. \\n\\nHe reports that flares continue occur less frequently - using hydrocortisone a few times per month and not needing to use desoximetasone, has been using vaseline more consistently (prefer over zinc oxide paste). Given improvement, will hold off on adding Elidel cream for now but advised patient to call if changes occur. Reviewed importance of minimizing use of topical steroids and avoiding application to thighs given development of striae.\\n\\nBiopsy at prior visit showed crusted psoriasiform dermatitis with small, slender structures within the stratum corneum which could be Corynebacteria organisms. \\n\\nNotes: \\nHe was not able to tolerate betamethasone augmented ointment\\nTacrolimus ointment cause irritation so avoiding further use \\nSwab for HSV negative at prior visit
Render Risk Assessment In Note?: no
Comment: Improved over the past year since minimizing use of topical steroids
Comment: Flare of upper arms on exam today - may have component of contact dermatitis due to new soap with fragrance so advised to stop further use. Call if not well controlled with below regimen

## 2023-01-03 RX ORDER — DESOXIMETASONE 2.5 MG/G
CREAM TOPICAL
Qty: 60 | Refills: 0 | Status: ERX

## 2023-05-23 ENCOUNTER — APPOINTMENT (RX ONLY)
Dept: URBAN - METROPOLITAN AREA CLINIC 16 | Facility: CLINIC | Age: 23
Setting detail: DERMATOLOGY
End: 2023-05-23

## 2023-05-23 DIAGNOSIS — L30.4 ERYTHEMA INTERTRIGO: ICD-10-CM | Status: WELL CONTROLLED

## 2023-05-23 DIAGNOSIS — L663 OTHER SPECIFIED DISEASES OF HAIR AND HAIR FOLLICLES: ICD-10-CM

## 2023-05-23 DIAGNOSIS — L81.2 FRECKLES: ICD-10-CM

## 2023-05-23 DIAGNOSIS — L90.6 STRIAE ATROPHICAE: ICD-10-CM

## 2023-05-23 DIAGNOSIS — L20.89 OTHER ATOPIC DERMATITIS: ICD-10-CM | Status: WELL CONTROLLED

## 2023-05-23 DIAGNOSIS — D22 MELANOCYTIC NEVI: ICD-10-CM

## 2023-05-23 DIAGNOSIS — L91.8 OTHER HYPERTROPHIC DISORDERS OF THE SKIN: ICD-10-CM

## 2023-05-23 DIAGNOSIS — L73.9 FOLLICULAR DISORDER, UNSPECIFIED: ICD-10-CM

## 2023-05-23 DIAGNOSIS — L738 OTHER SPECIFIED DISEASES OF HAIR AND HAIR FOLLICLES: ICD-10-CM

## 2023-05-23 PROBLEM — L02.12 FURUNCLE OF NECK: Status: ACTIVE | Noted: 2023-05-23

## 2023-05-23 PROBLEM — D22.5 MELANOCYTIC NEVI OF TRUNK: Status: ACTIVE | Noted: 2023-05-23

## 2023-05-23 PROBLEM — D22.71 MELANOCYTIC NEVI OF RIGHT LOWER LIMB, INCLUDING HIP: Status: ACTIVE | Noted: 2023-05-23

## 2023-05-23 PROCEDURE — ? DIAGNOSIS COMMENT

## 2023-05-23 PROCEDURE — ? TREATMENT REGIMEN

## 2023-05-23 PROCEDURE — ? FULL BODY SKIN EXAM

## 2023-05-23 PROCEDURE — ? SKIN TAG REMOVAL

## 2023-05-23 PROCEDURE — 99214 OFFICE O/P EST MOD 30 MIN: CPT | Mod: 25

## 2023-05-23 PROCEDURE — ? PRESCRIPTION

## 2023-05-23 PROCEDURE — ? COUNSELING

## 2023-05-23 PROCEDURE — ? SUNSCREEN RECOMMENDATIONS

## 2023-05-23 PROCEDURE — ? MEDICATION COUNSELING

## 2023-05-23 PROCEDURE — 11200 RMVL SKIN TAGS UP TO&INC 15: CPT

## 2023-05-23 PROCEDURE — ? PRESCRIPTION MEDICATION MANAGEMENT

## 2023-05-23 RX ORDER — BENZOYL PEROXIDE 10 G/100G
SUSPENSION TOPICAL
Qty: 227 | Refills: 5 | Status: ERX | COMMUNITY
Start: 2023-05-23

## 2023-05-23 RX ORDER — TRIAMCINOLONE ACETONIDE 1 MG/G
CREAM TOPICAL BID
Qty: 80 | Refills: 1 | Status: ERX | COMMUNITY
Start: 2023-05-23

## 2023-05-23 RX ORDER — DESOXIMETASONE 2.5 MG/G
CREAM TOPICAL
Qty: 60 | Refills: 1 | Status: ERX

## 2023-05-23 RX ADMIN — TRIAMCINOLONE ACETONIDE: 1 CREAM TOPICAL at 00:00

## 2023-05-23 RX ADMIN — BENZOYL PEROXIDE: 10 SUSPENSION TOPICAL at 00:00

## 2023-05-23 ASSESSMENT — LOCATION DETAILED DESCRIPTION DERM
LOCATION DETAILED: RIGHT ANTERIOR PROXIMAL THIGH
LOCATION DETAILED: LEFT SUPERIOR LATERAL UPPER BACK
LOCATION DETAILED: LEFT ANTERIOR PROXIMAL THIGH
LOCATION DETAILED: RIGHT DORSAL FOOT
LOCATION DETAILED: LEFT AXILLARY VAULT
LOCATION DETAILED: LEFT ANTERIOR PROXIMAL THIGH
LOCATION DETAILED: RIGHT VENTRAL PROXIMAL FOREARM
LOCATION DETAILED: LEFT INGUINAL CREASE
LOCATION DETAILED: GLUTEAL CLEFT
LOCATION DETAILED: RIGHT ANTERIOR PROXIMAL THIGH
LOCATION DETAILED: LEFT SUPERIOR MEDIAL UPPER BACK
LOCATION DETAILED: RIGHT SUPERIOR LATERAL UPPER BACK
LOCATION DETAILED: MID POSTERIOR NECK
LOCATION DETAILED: LEFT ANTECUBITAL SKIN
LOCATION DETAILED: LEFT LATERAL TRAPEZIAL NECK

## 2023-05-23 ASSESSMENT — LOCATION SIMPLE DESCRIPTION DERM
LOCATION SIMPLE: RIGHT FOREARM
LOCATION SIMPLE: RIGHT THIGH
LOCATION SIMPLE: RIGHT THIGH
LOCATION SIMPLE: POSTERIOR NECK
LOCATION SIMPLE: RIGHT UPPER BACK
LOCATION SIMPLE: GLUTEAL CLEFT
LOCATION SIMPLE: RIGHT FOOT
LOCATION SIMPLE: LEFT THIGH
LOCATION SIMPLE: LEFT THIGH
LOCATION SIMPLE: GROIN
LOCATION SIMPLE: LEFT UPPER BACK
LOCATION SIMPLE: LEFT ELBOW
LOCATION SIMPLE: LEFT AXILLARY VAULT

## 2023-05-23 ASSESSMENT — LOCATION ZONE DERM
LOCATION ZONE: NECK
LOCATION ZONE: FEET
LOCATION ZONE: AXILLAE
LOCATION ZONE: LEG
LOCATION ZONE: LEG
LOCATION ZONE: TRUNK
LOCATION ZONE: ARM

## 2023-05-23 NOTE — PROCEDURE: TREATMENT REGIMEN
Initiate Treatment: **If using steroid cream more frequently, call office for prescription for Elidel cream \\nCould consider adding Carpe wipes or Dove prescription strength antiperspirant at night. Could also consider “Fresh Balls” powder - can order online
Samples Given: Carpe wipes
Detail Level: Simple
Continue Regimen: For flare of rash: \\nContinue ketoconazole 2% cream twice daily \\nOnly for more severe flare: use triamcinolone or desoximetasone cream twice daily as needed For up to 1-2 consecutive weeks; avoid application to thighs \\nFor less severe flare: hydrocortisone cream twice daily \\n\\nAfter applying both of these, apply zinc oxide 40% paste or Vaseline (if preferred). Use a very thick layer of this. \\n\\nWhen flare resolves: \\nFor prevention, use either Zeasorb AF (antifungal) powder and/or prescription-strength deodorant such as Dove (apply at night).\\n\\nRecommend avoidance of scratching or scrubbing
Discontinue Regimen: Stop betamethasone diproprionate aug ointment given irritation\\nRemain off triamcinolone cream for now\\nStop tacrolimus ointment given irritation\\nRemain off clindamycin lotion

## 2023-05-23 NOTE — PROCEDURE: SKIN TAG REMOVAL
Medical Necessity Clause: This procedure was medically necessary because the lesions that were treated were:
Medical Necessity Information: It is in your best interest to select a reason for this procedure from the list below. All of these items fulfill various CMS LCD requirements except the new and changing color options.
Anesthesia Volume In Cc: 0
Anesthesia Type: 1% lidocaine with epinephrine
Include Z78.9 (Other Specified Conditions Influencing Health Status) As An Associated Diagnosis?: No
Add Associated Diagnoses If Applicable When Selecting Medical Necessity: Yes
Hemostasis: Aluminum Chloride
Detail Level: Detailed
Consent: Written consent obtained and the risks of skin tag removal was reviewed with the patient including but not limited to bleeding, pigmentary change, infection, pain, and remote possibility of scarring.

## 2023-05-23 NOTE — PROCEDURE: MIPS QUALITY
Quality 431: Preventive Care And Screening: Unhealthy Alcohol Use - Screening: Patient identified as an unhealthy alcohol user when screened for unhealthy alcohol use using a systematic screening method and received brief counseling
Quality 130: Documentation Of Current Medications In The Medical Record: Current Medications Documented
Quality 110: Preventive Care And Screening: Influenza Immunization: Influenza Immunization Administered during Influenza season
Detail Level: Detailed
Quality 226: Preventive Care And Screening: Tobacco Use: Screening And Cessation Intervention: Patient screened for tobacco use and is an ex/non-smoker

## 2023-05-23 NOTE — PROCEDURE: MEDICATION COUNSELING
Addended by: TAHMINA ERVIN on: 11/14/2022 04:17 PM     Modules accepted: Orders     Methotrexate Counseling:  Patient counseled regarding adverse effects of methotrexate including but not limited to nausea, vomiting, abnormalities in liver function tests. Patients may develop mouth sores, rash, diarrhea, and abnormalities in blood counts. The patient understands that monitoring is required including LFT's and blood counts.  There is a rare possibility of scarring of the liver and lung problems that can occur when taking methotrexate. Persistent nausea, loss of appetite, pale stools, dark urine, cough, and shortness of breath should be reported immediately. Patient advised to discontinue methotrexate treatment at least three months before attempting to become pregnant.  I discussed the need for folate supplements while taking methotrexate.  These supplements can decrease side effects during methotrexate treatment. The patient verbalized understanding of the proper use and possible adverse effects of methotrexate.  All of the patient's questions and concerns were addressed.

## 2023-05-23 NOTE — PROCEDURE: DIAGNOSIS COMMENT
Detail Level: Simple
Comment: Overall well controlled intertrigo (prior component of erythrasma clear on exam today). No current flare. \\n\\nPatient reports that he is well controlled with topicals. Reviewed ways to try to minimize sweating as that precipitates flares. \\n\\nBiopsy at prior visit showed crusted psoriasiform dermatitis with small, slender structures within the stratum corneum which could be Corynebacteria organisms. \\n\\nNotes: \\nHe was not able to tolerate betamethasone augmented ointment\\nTacrolimus ointment cause irritation so avoiding further use \\nSwab for HSV negative at prior visit
Render Risk Assessment In Note?: yes
Comment: Clear today, well controlled per patient with infrequent use of topical steroids
Render Risk Assessment In Note?: no

## 2023-05-23 NOTE — PROCEDURE: FULL BODY SKIN EXAM
Body Of Note (Please Add Your Own Text Here): S/p TBSE today
Price (Do Not Change): 0.00
Instructions: This plan will send the code FBSE to the PM system.  DO NOT or CHANGE the price.
Detail Level: Generalized

## 2023-05-23 NOTE — PROCEDURE: PRESCRIPTION MEDICATION MANAGEMENT
Render In Strict Bullet Format?: No
Detail Level: Zone
Continue Regimen: desoximetasone 0.25 % topical cream. Apply to more severe areas of eczema on body BID prn Monday-Friday, stop when improved. Avoid application to face/armpits/groin\\n\\ntriamcinolone acetonide 0.1 % topical cream. Apply to eczema (pink, itchy skin) twice daily as needed, use Monday-Friday, off on weekends, stop when improved. Avoid face/armpits/groin\\n\\nRecommend gentle skin care with frequent use of emollients - see handout
Initiate Treatment: benzoyl peroxide 10 % topical cleanser \\nSig: Wash affected areas daily (areas where breaking out). Leave on for a few minutes prior to rinsing off. May bleach clothing or towels.

## 2023-12-21 NOTE — PROCEDURE: MEDICATION COUNSELING
[facility] Siliq Counseling:  I discussed with the patient the risks of Siliq including but not limited to new or worsening depression, suicidal thoughts and behavior, immunosuppression, malignancy, posterior leukoencephalopathy syndrome, and serious infections.  The patient understands that monitoring is required including a PPD at baseline and must alert us or the primary physician if symptoms of infection or other concerning signs are noted. There is also a special program designed to monitor depression which is required with Siliq.

## 2024-03-25 ENCOUNTER — APPOINTMENT (RX ONLY)
Dept: URBAN - METROPOLITAN AREA CLINIC 16 | Facility: CLINIC | Age: 24
Setting detail: DERMATOLOGY
End: 2024-03-25

## 2024-03-25 DIAGNOSIS — L30.4 ERYTHEMA INTERTRIGO: ICD-10-CM | Status: WELL CONTROLLED

## 2024-03-25 DIAGNOSIS — D22 MELANOCYTIC NEVI: ICD-10-CM

## 2024-03-25 DIAGNOSIS — L20.89 OTHER ATOPIC DERMATITIS: ICD-10-CM | Status: WELL CONTROLLED

## 2024-03-25 PROBLEM — D22.61 MELANOCYTIC NEVI OF RIGHT UPPER LIMB, INCLUDING SHOULDER: Status: ACTIVE | Noted: 2024-03-25

## 2024-03-25 PROBLEM — D48.5 NEOPLASM OF UNCERTAIN BEHAVIOR OF SKIN: Status: ACTIVE | Noted: 2024-03-25

## 2024-03-25 PROBLEM — D22.71 MELANOCYTIC NEVI OF RIGHT LOWER LIMB, INCLUDING HIP: Status: ACTIVE | Noted: 2024-03-25

## 2024-03-25 PROCEDURE — ? PHOTO-DOCUMENTATION

## 2024-03-25 PROCEDURE — ? FULL BODY SKIN EXAM - DECLINED

## 2024-03-25 PROCEDURE — ? TREATMENT REGIMEN

## 2024-03-25 PROCEDURE — ? DIAGNOSIS COMMENT

## 2024-03-25 PROCEDURE — ? SHAVE REMOVAL

## 2024-03-25 PROCEDURE — 11300 SHAVE SKIN LESION 0.5 CM/<: CPT

## 2024-03-25 PROCEDURE — 99214 OFFICE O/P EST MOD 30 MIN: CPT | Mod: 25

## 2024-03-25 PROCEDURE — ? SUNSCREEN RECOMMENDATIONS

## 2024-03-25 PROCEDURE — ? PRESCRIPTION

## 2024-03-25 PROCEDURE — ? COUNSELING

## 2024-03-25 PROCEDURE — ? PRESCRIPTION MEDICATION MANAGEMENT

## 2024-03-25 RX ORDER — DESOXIMETASONE 2.5 MG/G
CREAM TOPICAL
Qty: 60 | Refills: 1 | Status: ERX

## 2024-03-25 ASSESSMENT — LOCATION DETAILED DESCRIPTION DERM
LOCATION DETAILED: RIGHT DORSAL FOOT
LOCATION DETAILED: LEFT ANTECUBITAL SKIN
LOCATION DETAILED: PERIUMBILICAL SKIN
LOCATION DETAILED: GLUTEAL CLEFT
LOCATION DETAILED: RIGHT VENTRAL PROXIMAL FOREARM
LOCATION DETAILED: RIGHT ANTERIOR SHOULDER
LOCATION DETAILED: LEFT INGUINAL CREASE
LOCATION DETAILED: RIGHT ANTERIOR PROXIMAL THIGH

## 2024-03-25 ASSESSMENT — LOCATION SIMPLE DESCRIPTION DERM
LOCATION SIMPLE: GROIN
LOCATION SIMPLE: ABDOMEN
LOCATION SIMPLE: RIGHT SHOULDER
LOCATION SIMPLE: GLUTEAL CLEFT
LOCATION SIMPLE: RIGHT THIGH
LOCATION SIMPLE: LEFT ELBOW
LOCATION SIMPLE: RIGHT FOREARM
LOCATION SIMPLE: RIGHT FOOT

## 2024-03-25 ASSESSMENT — LOCATION ZONE DERM
LOCATION ZONE: LEG
LOCATION ZONE: ARM
LOCATION ZONE: FEET
LOCATION ZONE: TRUNK

## 2024-03-25 NOTE — PROCEDURE: TREATMENT REGIMEN
Initiate Treatment: **If using steroid cream more frequently, call office for prescription for Elidel cream
Samples Given: Carpe wipes
Detail Level: Simple
Continue Regimen: For flare of rash: \\nContinue ketoconazole 2% cream twice daily \\nOnly for more severe flare: use triamcinolone or desoximetasone cream twice daily as needed For up to 1-2 consecutive weeks; avoid application to thighs \\nFor less severe flare: hydrocortisone cream twice daily as needed, stop when improved \\nAfter applying both of these, apply zinc oxide 40% paste or Vaseline (if preferred).\\n\\nWhen flare resolves: \\nFor prevention, use either Zeasorb AF (antifungal) powder and/or prescription-strength deodorant such as Dove (apply at night).\\n\\nRecommend avoidance of scratching or scrubbing
Discontinue Regimen: Stop betamethasone diproprionate aug ointment given irritation\\nStop tacrolimus ointment given irritation\\nRemain off clindamycin lotion

## 2024-03-25 NOTE — PROCEDURE: FULL BODY SKIN EXAM - DECLINED
Instructions: This plan will send the code FBSD to the PM system.  DO NOT or CHANGE the price.
Body Of Note (Please Add Your Own Text Here): S/p limited skin exam today. Patient declined TBSE today
Price (Do Not Change): 0.00
Detail Level: Simple

## 2024-03-25 NOTE — PROCEDURE: PRESCRIPTION MEDICATION MANAGEMENT
Render In Strict Bullet Format?: No
Detail Level: Zone
Continue Regimen: desoximetasone 0.25 % topical cream. Apply to more severe areas of eczema on body BID prn Monday-Friday, stop when improved. Avoid application to face/armpits/groin\\n\\ntriamcinolone acetonide 0.1 % topical cream. Apply to eczema (pink, itchy skin) twice daily as needed, use Monday-Friday, off on weekends, stop when improved. Avoid face/armpits/groin\\n\\nRecommend gentle skin care with frequent use of emollients - see handout

## 2024-03-25 NOTE — PROCEDURE: DIAGNOSIS COMMENT
Detail Level: Simple
Comment: Overall well controlled intertrigo (prior component of erythrasma clear on exam today). No current flare. Patient reports rare use of topical steroid about once per month so is declining prescription for Elidel at this time. Primarily using Zeasorb powder with good control. \\n\\nBiopsy at prior visit showed crusted psoriasiform dermatitis with small, slender structures within the stratum corneum which could be Corynebacteria organisms. \\n\\nNotes: \\nHe was not able to tolerate betamethasone augmented ointment\\nTacrolimus ointment cause irritation so avoiding further use \\nSwab for HSV negative at prior visit
Render Risk Assessment In Note?: yes
Comment: Clear today, well controlled per patient with infrequent use of topical steroids
Render Risk Assessment In Note?: no
Comment: Favor blue nevus at edge of scar. Patient reports stable and unchanging x years. Prior scar from benign mole removal as a child.